# Patient Record
Sex: MALE | Race: WHITE | NOT HISPANIC OR LATINO | URBAN - METROPOLITAN AREA
[De-identification: names, ages, dates, MRNs, and addresses within clinical notes are randomized per-mention and may not be internally consistent; named-entity substitution may affect disease eponyms.]

---

## 2017-08-16 ENCOUNTER — INPATIENT (INPATIENT)
Facility: HOSPITAL | Age: 72
LOS: 1 days | Discharge: HOME | DRG: 192 | End: 2017-08-18
Attending: FAMILY MEDICINE | Admitting: INTERNAL MEDICINE
Payer: MEDICARE

## 2017-08-16 VITALS
TEMPERATURE: 98 F | RESPIRATION RATE: 16 BRPM | DIASTOLIC BLOOD PRESSURE: 74 MMHG | OXYGEN SATURATION: 91 % | HEIGHT: 73 IN | HEART RATE: 98 BPM | WEIGHT: 177.91 LBS | SYSTOLIC BLOOD PRESSURE: 105 MMHG

## 2017-08-16 DIAGNOSIS — Z95.5 PRESENCE OF CORONARY ANGIOPLASTY IMPLANT AND GRAFT: Chronic | ICD-10-CM

## 2017-08-16 DIAGNOSIS — R06.09 OTHER FORMS OF DYSPNEA: ICD-10-CM

## 2017-08-16 LAB
ALBUMIN SERPL ELPH-MCNC: 3.5 G/DL — SIGNIFICANT CHANGE UP (ref 3.3–5.2)
ALP SERPL-CCNC: 63 U/L — SIGNIFICANT CHANGE UP (ref 40–120)
ALT FLD-CCNC: 56 U/L — HIGH
ANION GAP SERPL CALC-SCNC: 18 MMOL/L — HIGH (ref 5–17)
AST SERPL-CCNC: 32 U/L — SIGNIFICANT CHANGE UP
BASE EXCESS BLDA CALC-SCNC: 1.5 MMOL/L — SIGNIFICANT CHANGE UP (ref -3–3)
BASOPHILS # BLD AUTO: 0 K/UL — SIGNIFICANT CHANGE UP (ref 0–0.2)
BASOPHILS NFR BLD AUTO: 0.3 % — SIGNIFICANT CHANGE UP (ref 0–2)
BILIRUB SERPL-MCNC: 0.9 MG/DL — SIGNIFICANT CHANGE UP (ref 0.4–2)
BUN SERPL-MCNC: 31 MG/DL — HIGH (ref 8–20)
CALCIUM SERPL-MCNC: 10.6 MG/DL — HIGH (ref 8.6–10.2)
CHLORIDE SERPL-SCNC: 99 MMOL/L — SIGNIFICANT CHANGE UP (ref 98–107)
CO2 SERPL-SCNC: 23 MMOL/L — SIGNIFICANT CHANGE UP (ref 22–29)
CREAT SERPL-MCNC: 1.76 MG/DL — HIGH (ref 0.5–1.3)
D DIMER BLD IA.RAPID-MCNC: 172 NG/ML DDU — SIGNIFICANT CHANGE UP
EOSINOPHIL # BLD AUTO: 0.9 K/UL — HIGH (ref 0–0.5)
EOSINOPHIL NFR BLD AUTO: 7.3 % — HIGH (ref 0–6)
GAS PNL BLDA: SIGNIFICANT CHANGE UP
GLUCOSE SERPL-MCNC: 113 MG/DL — SIGNIFICANT CHANGE UP (ref 70–115)
HCO3 BLDA-SCNC: 26 MMOL/L — SIGNIFICANT CHANGE UP (ref 20–26)
HCT VFR BLD CALC: 46.4 % — SIGNIFICANT CHANGE UP (ref 42–52)
HGB BLD-MCNC: 15.7 G/DL — SIGNIFICANT CHANGE UP (ref 14–18)
HOROWITZ INDEX BLDA+IHG-RTO: SIGNIFICANT CHANGE UP
LYMPHOCYTES # BLD AUTO: 1.1 K/UL — SIGNIFICANT CHANGE UP (ref 1–4.8)
LYMPHOCYTES # BLD AUTO: 8.6 % — LOW (ref 20–55)
MCHC RBC-ENTMCNC: 30.5 PG — SIGNIFICANT CHANGE UP (ref 27–31)
MCHC RBC-ENTMCNC: 33.8 G/DL — SIGNIFICANT CHANGE UP (ref 32–36)
MCV RBC AUTO: 90.3 FL — SIGNIFICANT CHANGE UP (ref 80–94)
MONOCYTES # BLD AUTO: 1 K/UL — HIGH (ref 0–0.8)
MONOCYTES NFR BLD AUTO: 7.7 % — SIGNIFICANT CHANGE UP (ref 3–10)
NEUTROPHILS # BLD AUTO: 9.6 K/UL — HIGH (ref 1.8–8)
NEUTROPHILS NFR BLD AUTO: 75.8 % — HIGH (ref 37–73)
NT-PROBNP SERPL-SCNC: 284 PG/ML — SIGNIFICANT CHANGE UP (ref 0–300)
PCO2 BLDA: 29 MMHG — LOW (ref 35–45)
PH BLDA: 7.51 — HIGH (ref 7.35–7.45)
PLATELET # BLD AUTO: 226 K/UL — SIGNIFICANT CHANGE UP (ref 150–400)
PO2 BLDA: 65 MMHG — LOW (ref 83–108)
POTASSIUM SERPL-MCNC: 4.7 MMOL/L — SIGNIFICANT CHANGE UP (ref 3.5–5.3)
POTASSIUM SERPL-SCNC: 4.7 MMOL/L — SIGNIFICANT CHANGE UP (ref 3.5–5.3)
PROT SERPL-MCNC: 6.6 G/DL — SIGNIFICANT CHANGE UP (ref 6.6–8.7)
RBC # BLD: 5.14 M/UL — SIGNIFICANT CHANGE UP (ref 4.6–6.2)
RBC # FLD: 16 % — HIGH (ref 11–15.6)
SAO2 % BLDA: 94 % — LOW (ref 95–99)
SODIUM SERPL-SCNC: 140 MMOL/L — SIGNIFICANT CHANGE UP (ref 135–145)
TROPONIN T SERPL-MCNC: <0.01 NG/ML — SIGNIFICANT CHANGE UP (ref 0–0.06)
WBC # BLD: 12.6 K/UL — HIGH (ref 4.8–10.8)
WBC # FLD AUTO: 12.6 K/UL — HIGH (ref 4.8–10.8)

## 2017-08-16 PROCEDURE — 71010: CPT | Mod: 26

## 2017-08-16 PROCEDURE — 99285 EMERGENCY DEPT VISIT HI MDM: CPT

## 2017-08-16 PROCEDURE — 93010 ELECTROCARDIOGRAM REPORT: CPT

## 2017-08-16 RX ORDER — SODIUM CHLORIDE 9 MG/ML
1000 INJECTION INTRAMUSCULAR; INTRAVENOUS; SUBCUTANEOUS ONCE
Qty: 0 | Refills: 0 | Status: COMPLETED | OUTPATIENT
Start: 2017-08-16 | End: 2017-08-16

## 2017-08-16 RX ADMIN — SODIUM CHLORIDE 1000 MILLILITER(S): 9 INJECTION INTRAMUSCULAR; INTRAVENOUS; SUBCUTANEOUS at 12:51

## 2017-08-16 RX ADMIN — Medication 125 MILLIGRAM(S): at 12:55

## 2017-08-16 NOTE — ED PROVIDER NOTE - CARE PLAN
Principal Discharge DX:	Chronic obstructive pulmonary disease, unspecified COPD type Principal Discharge DX:	Dyspnea on exertion

## 2017-08-16 NOTE — ED PROVIDER NOTE - OBJECTIVE STATEMENT
72 yo wm pmh copd, cad with history stent comes to ed with increasing shortness of breath, cough with yellowish phlegm and low grade fever  99; as per wife, pmd and cardiologist are from [place]

## 2017-08-16 NOTE — CONSULT NOTE ADULT - ASSESSMENT
Assess  AECOPD  Platelike atelectasis  Hypoxia  Rec  Steroid taper  DuoNeb  Azithromycin  Pursed-lip breathing taught to patient

## 2017-08-16 NOTE — ED ADULT NURSE NOTE - OBJECTIVE STATEMENT
assumed pt care at 1145.  pt awake alert and oriented x3 c/o shortness of breath and JASSO since monday.  hx of COPD.  lung sounds diminished on left side.  no signs of acute distress at this time.  denies chest pain.  moving all extremities well.  +1 edema to b/l lower extremities.  abdomen soft, nontender, non distended.  moving all extremities well.  skin warm and dry.  denies home o2 use.  NSR on cardiac monitor.

## 2017-08-16 NOTE — ED ADULT NURSE REASSESSMENT NOTE - NS ED NURSE REASSESS COMMENT FT1
pt remains awake alert and oriented x3 with no signs of acute distress.  call placed to CT, will send for pt.  will continue to monitor.

## 2017-08-16 NOTE — CONSULT NOTE ADULT - SUBJECTIVE AND OBJECTIVE BOX
1PULMONARY CONSULT NOTE      LORRI LUTZEMBER-[id number]    Patient is a 71y old  Male who presents with a chief complaint of dyspnea  Min cough.  Thick clear sputum    Advance COPD on Breo and spiriva  No 02  Chronic, stable, moderate, 1/2 flight JASSO relieved with 4 minutes of rest  DC cig in 2012    HISTORY OF PRESENT ILLNESS:  COPD  PSH: penile implant removal    MEDICATIONS  (STANDING):      MEDICATIONS  (PRN):      Allergies    ciprofloxacin (Unknown)  penicillin (Rash)  vancomycin (Unknown)    Intolerances        PAST MEDICAL & SURGICAL HISTORY:  Gout  CKD (chronic kidney disease)  Hyperlipidemia  Hypertension  H/O heart artery stent      FAMILY HISTORY:      SOCIAL HISTORY  Smoking History: 70 py - DC in 2012    REVIEW OF SYSTEMS:    CONSTITUTIONAL:  No fevers, chills, sweats    HEENT:  Eyes:  No diplopia or blurred vision. ENT:  No earache, sore throat or runny nose.    CARDIOVASCULAR:  No pressure, squeezing, tightness, or heaviness about the chest; no palpitations.    RESPIRATORY:  No cough,  PND or orthopnea. Mod SOBOE    GASTROINTESTINAL:  No abdominal pain, nausea, vomiting or diarrhea.    GENITOURINARY:  No dysuria, frequency or urgency.    NEUROLOGIC:  No paresthesias, fasciculations, seizures or weakness.    PSYCHIATRIC:  No disorder of thought or mood.    Vital Signs Last 24 Hrs  T(C): 36.9 (16 Aug 2017 13:08), Max: 36.9 (16 Aug 2017 13:08)  T(F): 98.4 (16 Aug 2017 13:08), Max: 98.4 (16 Aug 2017 13:08)  HR: 88 (16 Aug 2017 13:08) (88 - 98)  BP: 112/64 (16 Aug 2017 13:08) (105/74 - 112/64)  BP(mean): --  RR: 16 (16 Aug 2017 13:08) (16 - 16)  SpO2: 95% (16 Aug 2017 13:08) (91% - 95%)    PHYSICAL EXAMINATION:    GENERAL: The patient is a well-developed, well-nourished _____in no apparent distress.     HEENT: Head is normocephalic and atraumatic. Extraocular muscles are intact. Mucous membranes are moist.     NECK: Supple.     LUNGS: Clear to auscultation without wheezing, rales, or rhonchi. Respirations unlabored    HEART: Regular rate and rhythm without murmur.    ABDOMEN: Soft, nontender, and nondistended.  No hepatosplenomegaly is noted.    EXTREMITIES: Without any cyanosis, clubbing, rash, lesions or edema.    NEUROLOGIC: Grossly intact.      LABS:                        15.7   12.6  )-----------( 226      ( 16 Aug 2017 13:26 )             46.4     08-16    140  |  99  |  31.0<H>  ----------------------------<  113  4.7   |  23.0  |  1.76<H>    Ca    10.6<H>      16 Aug 2017 13:26    TPro  6.6  /  Alb  3.5  /  TBili  0.9  /  DBili  x   /  AST  32  /  ALT  56<H>  /  AlkPhos  63  08-16        ABG - ( 16 Aug 2017 12:45 )  pH: 7.51  /  pCO2: 29    /  pO2: 65    / HCO3: 26    / Base Excess: 1.5   /  SaO2: 94                CARDIAC MARKERS ( 16 Aug 2017 13:26 )  x     / <0.01 ng/mL / x     / x     / x          D-Dimer Assay, Quantitative: 172 ng/mL DDU (08-16-17 @ 13:26)    Serum Pro-Brain Natriuretic Peptide: 284 pg/mL (08-16-17 @ 13:26)          MICROBIOLOGY:    RADIOLOGY & ADDITIONAL STUDIES:  CXR on 8/16/17  HI  R> L basilar platelike atelect

## 2017-08-16 NOTE — ED PROVIDER NOTE - MEDICAL DECISION MAKING DETAILS
copd, cad with dyspnea on exertion; o2 sat 88-90 after exertion; ct angio eval pe; pulmonary evaluation

## 2017-08-17 VITALS — TEMPERATURE: 98 F | OXYGEN SATURATION: 92 % | HEART RATE: 93 BPM | RESPIRATION RATE: 20 BRPM

## 2017-08-17 DIAGNOSIS — I25.10 ATHEROSCLEROTIC HEART DISEASE OF NATIVE CORONARY ARTERY WITHOUT ANGINA PECTORIS: ICD-10-CM

## 2017-08-17 DIAGNOSIS — J44.1 CHRONIC OBSTRUCTIVE PULMONARY DISEASE WITH (ACUTE) EXACERBATION: ICD-10-CM

## 2017-08-17 DIAGNOSIS — M10.9 GOUT, UNSPECIFIED: ICD-10-CM

## 2017-08-17 DIAGNOSIS — E03.9 HYPOTHYROIDISM, UNSPECIFIED: ICD-10-CM

## 2017-08-17 DIAGNOSIS — I10 ESSENTIAL (PRIMARY) HYPERTENSION: ICD-10-CM

## 2017-08-17 DIAGNOSIS — Z29.9 ENCOUNTER FOR PROPHYLACTIC MEASURES, UNSPECIFIED: ICD-10-CM

## 2017-08-17 DIAGNOSIS — K21.9 GASTRO-ESOPHAGEAL REFLUX DISEASE WITHOUT ESOPHAGITIS: ICD-10-CM

## 2017-08-17 DIAGNOSIS — Z90.79 ACQUIRED ABSENCE OF OTHER GENITAL ORGAN(S): Chronic | ICD-10-CM

## 2017-08-17 DIAGNOSIS — E78.5 HYPERLIPIDEMIA, UNSPECIFIED: ICD-10-CM

## 2017-08-17 DIAGNOSIS — N18.9 CHRONIC KIDNEY DISEASE, UNSPECIFIED: ICD-10-CM

## 2017-08-17 LAB
ALBUMIN SERPL ELPH-MCNC: 3.6 G/DL — SIGNIFICANT CHANGE UP (ref 3.3–5.2)
ALP SERPL-CCNC: 61 U/L — SIGNIFICANT CHANGE UP (ref 40–120)
ALT FLD-CCNC: 42 U/L — HIGH
ANION GAP SERPL CALC-SCNC: 16 MMOL/L — SIGNIFICANT CHANGE UP (ref 5–17)
ANISOCYTOSIS BLD QL: SLIGHT — SIGNIFICANT CHANGE UP
AST SERPL-CCNC: 19 U/L — SIGNIFICANT CHANGE UP
BASE EXCESS BLDA CALC-SCNC: 0.4 MMOL/L — SIGNIFICANT CHANGE UP (ref -3–3)
BILIRUB SERPL-MCNC: 0.5 MG/DL — SIGNIFICANT CHANGE UP (ref 0.4–2)
BLOOD GAS COMMENTS ARTERIAL: SIGNIFICANT CHANGE UP
BUN SERPL-MCNC: 34 MG/DL — HIGH (ref 8–20)
CALCIUM SERPL-MCNC: 9.4 MG/DL — SIGNIFICANT CHANGE UP (ref 8.6–10.2)
CHLORIDE SERPL-SCNC: 101 MMOL/L — SIGNIFICANT CHANGE UP (ref 98–107)
CHOLEST SERPL-MCNC: 191 MG/DL — SIGNIFICANT CHANGE UP (ref 110–199)
CO2 SERPL-SCNC: 22 MMOL/L — SIGNIFICANT CHANGE UP (ref 22–29)
CREAT ?TM UR-MCNC: 111 MG/DL — SIGNIFICANT CHANGE UP
CREAT ?TM UR-MCNC: 114 MG/DL — SIGNIFICANT CHANGE UP
CREAT SERPL-MCNC: 1.72 MG/DL — HIGH (ref 0.5–1.3)
D DIMER BLD IA.RAPID-MCNC: 269 NG/ML DDU — HIGH
EOSINOPHIL NFR BLD AUTO: 1 % — SIGNIFICANT CHANGE UP (ref 0–6)
GAS PNL BLDA: SIGNIFICANT CHANGE UP
GLUCOSE SERPL-MCNC: 198 MG/DL — HIGH (ref 70–115)
HCO3 BLDA-SCNC: 25 MMOL/L — SIGNIFICANT CHANGE UP (ref 20–26)
HCT VFR BLD CALC: 43.4 % — SIGNIFICANT CHANGE UP (ref 42–52)
HDLC SERPL-MCNC: 63 MG/DL — SIGNIFICANT CHANGE UP
HGB BLD-MCNC: 15 G/DL — SIGNIFICANT CHANGE UP (ref 14–18)
HOROWITZ INDEX BLDA+IHG-RTO: 0.21 — SIGNIFICANT CHANGE UP
HYPOCHROMIA BLD QL: SLIGHT — SIGNIFICANT CHANGE UP
LIPID PNL WITH DIRECT LDL SERPL: 109 MG/DL — SIGNIFICANT CHANGE UP
LYMPHOCYTES # BLD AUTO: 7 % — LOW (ref 20–55)
MACROCYTES BLD QL: SLIGHT — SIGNIFICANT CHANGE UP
MAGNESIUM SERPL-MCNC: 1.9 MG/DL — SIGNIFICANT CHANGE UP (ref 1.6–2.6)
MCHC RBC-ENTMCNC: 30.9 PG — SIGNIFICANT CHANGE UP (ref 27–31)
MCHC RBC-ENTMCNC: 34.6 G/DL — SIGNIFICANT CHANGE UP (ref 32–36)
MCV RBC AUTO: 89.5 FL — SIGNIFICANT CHANGE UP (ref 80–94)
MICROCYTES BLD QL: SLIGHT — SIGNIFICANT CHANGE UP
MONOCYTES NFR BLD AUTO: 5 % — SIGNIFICANT CHANGE UP (ref 3–10)
NEUTROPHILS NFR BLD AUTO: 85 % — HIGH (ref 37–73)
NT-PROBNP SERPL-SCNC: 182 PG/ML — SIGNIFICANT CHANGE UP (ref 0–300)
OVALOCYTES BLD QL SMEAR: SLIGHT — SIGNIFICANT CHANGE UP
PCO2 BLDA: 30 MMHG — LOW (ref 35–45)
PH BLDA: 7.49 — HIGH (ref 7.35–7.45)
PHOSPHATE SERPL-MCNC: 3.4 MG/DL — SIGNIFICANT CHANGE UP (ref 2.4–4.7)
PLAT MORPH BLD: NORMAL — SIGNIFICANT CHANGE UP
PLATELET # BLD AUTO: 205 K/UL — SIGNIFICANT CHANGE UP (ref 150–400)
PO2 BLDA: 61 MMHG — LOW (ref 83–108)
POIKILOCYTOSIS BLD QL AUTO: SLIGHT — SIGNIFICANT CHANGE UP
POTASSIUM SERPL-MCNC: 4.3 MMOL/L — SIGNIFICANT CHANGE UP (ref 3.5–5.3)
POTASSIUM SERPL-SCNC: 4.3 MMOL/L — SIGNIFICANT CHANGE UP (ref 3.5–5.3)
PREALB SERPL-MCNC: 21 MG/DL — SIGNIFICANT CHANGE UP (ref 18–38)
PROCALCITONIN SERPL-MCNC: <0.05 NG/ML — SIGNIFICANT CHANGE UP (ref 0–0.04)
PROT ?TM UR-MCNC: 7 MG/DL — SIGNIFICANT CHANGE UP (ref 0–12)
PROT SERPL-MCNC: 6.4 G/DL — LOW (ref 6.6–8.7)
PROT/CREAT UR-RTO: 0.1 RATIO — SIGNIFICANT CHANGE UP
RBC # BLD: 4.85 M/UL — SIGNIFICANT CHANGE UP (ref 4.6–6.2)
RBC # FLD: 15.2 % — SIGNIFICANT CHANGE UP (ref 11–15.6)
RBC BLD AUTO: ABNORMAL
SAO2 % BLDA: 92 % — LOW (ref 95–99)
SODIUM SERPL-SCNC: 139 MMOL/L — SIGNIFICANT CHANGE UP (ref 135–145)
SODIUM UR-SCNC: 31 MMOL/L — SIGNIFICANT CHANGE UP
TOTAL CHOLESTEROL/HDL RATIO MEASUREMENT: 3 RATIO — LOW (ref 3.4–9.6)
TRIGL SERPL-MCNC: 93 MG/DL — SIGNIFICANT CHANGE UP (ref 10–200)
TSH SERPL-MCNC: 0.53 UIU/ML — SIGNIFICANT CHANGE UP (ref 0.27–4.2)
VARIANT LYMPHS # BLD: 2 % — SIGNIFICANT CHANGE UP (ref 0–6)
WBC # BLD: 10.5 K/UL — SIGNIFICANT CHANGE UP (ref 4.8–10.8)
WBC # FLD AUTO: 10.5 K/UL — SIGNIFICANT CHANGE UP (ref 4.8–10.8)

## 2017-08-17 PROCEDURE — 84300 ASSAY OF URINE SODIUM: CPT

## 2017-08-17 PROCEDURE — 83880 ASSAY OF NATRIURETIC PEPTIDE: CPT

## 2017-08-17 PROCEDURE — 93005 ELECTROCARDIOGRAM TRACING: CPT

## 2017-08-17 PROCEDURE — 84156 ASSAY OF PROTEIN URINE: CPT

## 2017-08-17 PROCEDURE — 36415 COLL VENOUS BLD VENIPUNCTURE: CPT

## 2017-08-17 PROCEDURE — 93306 TTE W/DOPPLER COMPLETE: CPT | Mod: 26

## 2017-08-17 PROCEDURE — 85027 COMPLETE CBC AUTOMATED: CPT

## 2017-08-17 PROCEDURE — 80053 COMPREHEN METABOLIC PANEL: CPT

## 2017-08-17 PROCEDURE — 96375 TX/PRO/DX INJ NEW DRUG ADDON: CPT | Mod: XU

## 2017-08-17 PROCEDURE — 84443 ASSAY THYROID STIM HORMONE: CPT

## 2017-08-17 PROCEDURE — 84134 ASSAY OF PREALBUMIN: CPT

## 2017-08-17 PROCEDURE — 93970 EXTREMITY STUDY: CPT

## 2017-08-17 PROCEDURE — 84100 ASSAY OF PHOSPHORUS: CPT

## 2017-08-17 PROCEDURE — 84484 ASSAY OF TROPONIN QUANT: CPT

## 2017-08-17 PROCEDURE — 82570 ASSAY OF URINE CREATININE: CPT

## 2017-08-17 PROCEDURE — 71045 X-RAY EXAM CHEST 1 VIEW: CPT

## 2017-08-17 PROCEDURE — 99285 EMERGENCY DEPT VISIT HI MDM: CPT | Mod: 25

## 2017-08-17 PROCEDURE — 93970 EXTREMITY STUDY: CPT | Mod: 26

## 2017-08-17 PROCEDURE — 93306 TTE W/DOPPLER COMPLETE: CPT

## 2017-08-17 PROCEDURE — 76770 US EXAM ABDO BACK WALL COMP: CPT

## 2017-08-17 PROCEDURE — 99239 HOSP IP/OBS DSCHRG MGMT >30: CPT

## 2017-08-17 PROCEDURE — 99223 1ST HOSP IP/OBS HIGH 75: CPT

## 2017-08-17 PROCEDURE — 83735 ASSAY OF MAGNESIUM: CPT

## 2017-08-17 PROCEDURE — 96374 THER/PROPH/DIAG INJ IV PUSH: CPT | Mod: XU

## 2017-08-17 PROCEDURE — 76770 US EXAM ABDO BACK WALL COMP: CPT | Mod: 26

## 2017-08-17 PROCEDURE — 80061 LIPID PANEL: CPT

## 2017-08-17 PROCEDURE — 85379 FIBRIN DEGRADATION QUANT: CPT

## 2017-08-17 PROCEDURE — 82803 BLOOD GASES ANY COMBINATION: CPT

## 2017-08-17 PROCEDURE — 94640 AIRWAY INHALATION TREATMENT: CPT

## 2017-08-17 PROCEDURE — 36600 WITHDRAWAL OF ARTERIAL BLOOD: CPT

## 2017-08-17 PROCEDURE — 84145 PROCALCITONIN (PCT): CPT

## 2017-08-17 PROCEDURE — 87040 BLOOD CULTURE FOR BACTERIA: CPT

## 2017-08-17 RX ORDER — LEVOTHYROXINE SODIUM 125 MCG
1 TABLET ORAL
Qty: 0 | Refills: 0 | COMMUNITY
Start: 2017-08-17

## 2017-08-17 RX ORDER — LEVOTHYROXINE SODIUM 125 MCG
75 TABLET ORAL EVERY OTHER DAY
Qty: 0 | Refills: 0 | Status: DISCONTINUED | OUTPATIENT
Start: 2017-08-17 | End: 2017-08-18

## 2017-08-17 RX ORDER — SACCHAROMYCES BOULARDII 250 MG
1 POWDER IN PACKET (EA) ORAL
Qty: 6 | Refills: 0 | OUTPATIENT
Start: 2017-08-17 | End: 2017-08-20

## 2017-08-17 RX ORDER — FUROSEMIDE 40 MG
20 TABLET ORAL DAILY
Qty: 0 | Refills: 0 | Status: DISCONTINUED | OUTPATIENT
Start: 2017-08-17 | End: 2017-08-18

## 2017-08-17 RX ORDER — AZITHROMYCIN 500 MG/1
1 TABLET, FILM COATED ORAL
Qty: 3 | Refills: 0 | OUTPATIENT
Start: 2017-08-17 | End: 2017-08-20

## 2017-08-17 RX ORDER — CLOPIDOGREL BISULFATE 75 MG/1
1 TABLET, FILM COATED ORAL
Qty: 0 | Refills: 0 | COMMUNITY

## 2017-08-17 RX ORDER — TIOTROPIUM BROMIDE 18 UG/1
2 CAPSULE ORAL; RESPIRATORY (INHALATION)
Qty: 1 | Refills: 0 | OUTPATIENT
Start: 2017-08-17 | End: 2017-09-16

## 2017-08-17 RX ORDER — ATORVASTATIN CALCIUM 80 MG/1
1 TABLET, FILM COATED ORAL
Qty: 30 | Refills: 0 | OUTPATIENT
Start: 2017-08-17 | End: 2017-09-16

## 2017-08-17 RX ORDER — ALBUTEROL 90 UG/1
0 AEROSOL, METERED ORAL
Qty: 0 | Refills: 0 | COMMUNITY

## 2017-08-17 RX ORDER — LEVOTHYROXINE SODIUM 125 MCG
1 TABLET ORAL
Qty: 15 | Refills: 0 | OUTPATIENT
Start: 2017-08-17 | End: 2017-09-16

## 2017-08-17 RX ORDER — ASPIRIN/CALCIUM CARB/MAGNESIUM 324 MG
1 TABLET ORAL
Qty: 30 | Refills: 0 | OUTPATIENT
Start: 2017-08-17 | End: 2017-09-16

## 2017-08-17 RX ORDER — LACOSAMIDE 50 MG/1
100 TABLET ORAL ONCE
Qty: 0 | Refills: 0 | Status: COMPLETED | OUTPATIENT
Start: 2017-08-17 | End: 2017-08-17

## 2017-08-17 RX ORDER — FLUTICASONE FUROATE AND VILANTEROL TRIFENATATE 100; 25 UG/1; UG/1
1 POWDER RESPIRATORY (INHALATION)
Qty: 1 | Refills: 0 | OUTPATIENT
Start: 2017-08-17 | End: 2017-09-16

## 2017-08-17 RX ORDER — PRIMIDONE 250 MG/1
250 TABLET ORAL ONCE
Qty: 0 | Refills: 0 | Status: DISCONTINUED | OUTPATIENT
Start: 2017-08-17 | End: 2017-08-18

## 2017-08-17 RX ORDER — PANTOPRAZOLE SODIUM 20 MG/1
0 TABLET, DELAYED RELEASE ORAL
Qty: 0 | Refills: 0 | COMMUNITY

## 2017-08-17 RX ORDER — IPRATROPIUM/ALBUTEROL SULFATE 18-103MCG
3 AEROSOL WITH ADAPTER (GRAM) INHALATION EVERY 6 HOURS
Qty: 0 | Refills: 0 | Status: DISCONTINUED | OUTPATIENT
Start: 2017-08-17 | End: 2017-08-18

## 2017-08-17 RX ORDER — FUROSEMIDE 40 MG
0 TABLET ORAL
Qty: 0 | Refills: 0 | COMMUNITY

## 2017-08-17 RX ORDER — ALBUTEROL 90 UG/1
2.5 AEROSOL, METERED ORAL EVERY 4 HOURS
Qty: 0 | Refills: 0 | Status: DISCONTINUED | OUTPATIENT
Start: 2017-08-17 | End: 2017-08-17

## 2017-08-17 RX ORDER — LEVOTHYROXINE SODIUM 125 MCG
1 TABLET ORAL
Qty: 0 | Refills: 0 | COMMUNITY

## 2017-08-17 RX ORDER — AZITHROMYCIN 500 MG/1
500 TABLET, FILM COATED ORAL ONCE
Qty: 0 | Refills: 0 | Status: COMPLETED | OUTPATIENT
Start: 2017-08-17 | End: 2017-08-17

## 2017-08-17 RX ORDER — ATORVASTATIN CALCIUM 80 MG/1
0 TABLET, FILM COATED ORAL
Qty: 0 | Refills: 0 | COMMUNITY

## 2017-08-17 RX ORDER — TIOTROPIUM BROMIDE 18 UG/1
2 CAPSULE ORAL; RESPIRATORY (INHALATION)
Qty: 0 | Refills: 0 | COMMUNITY

## 2017-08-17 RX ORDER — ALLOPURINOL 300 MG
1 TABLET ORAL
Qty: 30 | Refills: 0 | OUTPATIENT
Start: 2017-08-17 | End: 2017-09-16

## 2017-08-17 RX ORDER — ALBUTEROL 90 UG/1
2 AEROSOL, METERED ORAL
Qty: 1 | Refills: 0 | OUTPATIENT
Start: 2017-08-17 | End: 2017-09-16

## 2017-08-17 RX ORDER — ALLOPURINOL 300 MG
100 TABLET ORAL DAILY
Qty: 0 | Refills: 0 | Status: DISCONTINUED | OUTPATIENT
Start: 2017-08-17 | End: 2017-08-18

## 2017-08-17 RX ORDER — IPRATROPIUM/ALBUTEROL SULFATE 18-103MCG
3 AEROSOL WITH ADAPTER (GRAM) INHALATION
Qty: 0 | Refills: 0 | COMMUNITY
Start: 2017-08-17

## 2017-08-17 RX ORDER — CLOPIDOGREL BISULFATE 75 MG/1
75 TABLET, FILM COATED ORAL DAILY
Qty: 0 | Refills: 0 | Status: DISCONTINUED | OUTPATIENT
Start: 2017-08-17 | End: 2017-08-18

## 2017-08-17 RX ORDER — ATORVASTATIN CALCIUM 80 MG/1
1 TABLET, FILM COATED ORAL
Qty: 0 | Refills: 0 | COMMUNITY
Start: 2017-08-17

## 2017-08-17 RX ORDER — ATORVASTATIN CALCIUM 80 MG/1
1 TABLET, FILM COATED ORAL
Qty: 0 | Refills: 0 | COMMUNITY

## 2017-08-17 RX ORDER — LEVOTHYROXINE SODIUM 125 MCG
88 TABLET ORAL EVERY OTHER DAY
Qty: 0 | Refills: 0 | Status: DISCONTINUED | OUTPATIENT
Start: 2017-08-18 | End: 2017-08-18

## 2017-08-17 RX ORDER — SACCHAROMYCES BOULARDII 250 MG
1 POWDER IN PACKET (EA) ORAL
Qty: 5 | Refills: 0 | OUTPATIENT
Start: 2017-08-17 | End: 2017-09-16

## 2017-08-17 RX ORDER — PANTOPRAZOLE SODIUM 20 MG/1
1 TABLET, DELAYED RELEASE ORAL
Qty: 0 | Refills: 0 | COMMUNITY

## 2017-08-17 RX ORDER — ALBUTEROL 90 UG/1
2 AEROSOL, METERED ORAL
Qty: 0 | Refills: 0 | COMMUNITY

## 2017-08-17 RX ORDER — ENOXAPARIN SODIUM 100 MG/ML
40 INJECTION SUBCUTANEOUS DAILY
Qty: 0 | Refills: 0 | Status: DISCONTINUED | OUTPATIENT
Start: 2017-08-17 | End: 2017-08-18

## 2017-08-17 RX ORDER — FUROSEMIDE 40 MG
1 TABLET ORAL
Qty: 0 | Refills: 0 | COMMUNITY

## 2017-08-17 RX ORDER — ENOXAPARIN SODIUM 100 MG/ML
30 INJECTION SUBCUTANEOUS DAILY
Qty: 0 | Refills: 0 | Status: DISCONTINUED | OUTPATIENT
Start: 2017-08-17 | End: 2017-08-17

## 2017-08-17 RX ORDER — LEVOTHYROXINE SODIUM 125 MCG
0 TABLET ORAL
Qty: 0 | Refills: 0 | COMMUNITY

## 2017-08-17 RX ORDER — FUROSEMIDE 40 MG
1 TABLET ORAL
Qty: 30 | Refills: 0 | OUTPATIENT
Start: 2017-08-17 | End: 2017-09-16

## 2017-08-17 RX ORDER — ASPIRIN/CALCIUM CARB/MAGNESIUM 324 MG
1 TABLET ORAL
Qty: 0 | Refills: 0 | COMMUNITY

## 2017-08-17 RX ORDER — CLOPIDOGREL BISULFATE 75 MG/1
1 TABLET, FILM COATED ORAL
Qty: 30 | Refills: 0 | OUTPATIENT
Start: 2017-08-17 | End: 2017-09-16

## 2017-08-17 RX ORDER — AZITHROMYCIN 500 MG/1
TABLET, FILM COATED ORAL
Qty: 0 | Refills: 0 | Status: DISCONTINUED | OUTPATIENT
Start: 2017-08-17 | End: 2017-08-17

## 2017-08-17 RX ORDER — SACCHAROMYCES BOULARDII 250 MG
250 POWDER IN PACKET (EA) ORAL
Qty: 0 | Refills: 0 | Status: DISCONTINUED | OUTPATIENT
Start: 2017-08-17 | End: 2017-08-18

## 2017-08-17 RX ORDER — PANTOPRAZOLE SODIUM 20 MG/1
40 TABLET, DELAYED RELEASE ORAL
Qty: 0 | Refills: 0 | Status: DISCONTINUED | OUTPATIENT
Start: 2017-08-17 | End: 2017-08-18

## 2017-08-17 RX ORDER — FLUTICASONE FUROATE AND VILANTEROL TRIFENATATE 100; 25 UG/1; UG/1
1 POWDER RESPIRATORY (INHALATION)
Qty: 0 | Refills: 0 | COMMUNITY

## 2017-08-17 RX ORDER — CLONAZEPAM 1 MG
0.5 TABLET ORAL ONCE
Qty: 0 | Refills: 0 | Status: DISCONTINUED | OUTPATIENT
Start: 2017-08-17 | End: 2017-08-18

## 2017-08-17 RX ORDER — AZITHROMYCIN 500 MG/1
500 TABLET, FILM COATED ORAL EVERY 24 HOURS
Qty: 0 | Refills: 0 | Status: DISCONTINUED | OUTPATIENT
Start: 2017-08-18 | End: 2017-08-18

## 2017-08-17 RX ORDER — ASPIRIN/CALCIUM CARB/MAGNESIUM 324 MG
81 TABLET ORAL DAILY
Qty: 0 | Refills: 0 | Status: DISCONTINUED | OUTPATIENT
Start: 2017-08-17 | End: 2017-08-18

## 2017-08-17 RX ORDER — CLOPIDOGREL BISULFATE 75 MG/1
0 TABLET, FILM COATED ORAL
Qty: 0 | Refills: 0 | COMMUNITY

## 2017-08-17 RX ORDER — ATORVASTATIN CALCIUM 80 MG/1
10 TABLET, FILM COATED ORAL AT BEDTIME
Qty: 0 | Refills: 0 | Status: DISCONTINUED | OUTPATIENT
Start: 2017-08-17 | End: 2017-08-18

## 2017-08-17 RX ORDER — ALBUTEROL 90 UG/1
2.5 AEROSOL, METERED ORAL EVERY 6 HOURS
Qty: 0 | Refills: 0 | Status: DISCONTINUED | OUTPATIENT
Start: 2017-08-17 | End: 2017-08-18

## 2017-08-17 RX ORDER — MAGNESIUM SULFATE 500 MG/ML
1 VIAL (ML) INJECTION ONCE
Qty: 0 | Refills: 0 | Status: COMPLETED | OUTPATIENT
Start: 2017-08-17 | End: 2017-08-17

## 2017-08-17 RX ADMIN — Medication 250 MILLIGRAM(S): at 05:27

## 2017-08-17 RX ADMIN — Medication 3 MILLILITER(S): at 03:16

## 2017-08-17 RX ADMIN — AZITHROMYCIN 250 MILLIGRAM(S): 500 TABLET, FILM COATED ORAL at 01:27

## 2017-08-17 RX ADMIN — PANTOPRAZOLE SODIUM 40 MILLIGRAM(S): 20 TABLET, DELAYED RELEASE ORAL at 05:27

## 2017-08-17 RX ADMIN — Medication 100 GRAM(S): at 08:04

## 2017-08-17 RX ADMIN — Medication 3 MILLILITER(S): at 14:59

## 2017-08-17 RX ADMIN — Medication 40 MILLIGRAM(S): at 05:27

## 2017-08-17 NOTE — DISCHARGE NOTE ADULT - PATIENT PORTAL LINK FT
“You can access the Follow[facility] Patient Portal, offered by [facility], by registering with the following website: [email or web address]”

## 2017-08-17 NOTE — PROGRESS NOTE ADULT - PROBLEM SELECTOR PLAN 1
Oxygen 2L NC titrate as needed to keep Saturation between 90-94%  Duonebs Q6H & Albuterol Q6H PRN  s/p Solumedrol 125mg IVP X 1 in ED  Solumedrol 40 mg IVP Q12H  Zithromax 500mg IVP daily (antibiotics day 2)  Pulmonary (Dr. Helton) on board   Protonix 40mg PO daily  f/u Sputum & blood cultures

## 2017-08-17 NOTE — DISCHARGE NOTE ADULT - PLAN OF CARE
resolving continue treatment with your nebulizers f/u with a nephrologist; maintenance continue bp meds; maintenance; continue aspirin and statins; continue statins continue treatment with your nebulizers  continue prednisone taper  Follow up with Pulmonologist  COnsider work up for sleep apnea; might need a sleep study f/u with a nephrologist; maintain euvolemia

## 2017-08-17 NOTE — PROGRESS NOTE ADULT - SUBJECTIVE AND OBJECTIVE BOX
INTERVAL HPI/OVERNIGHT EVENTS:  71 year old male w/ PMH of COPD, CAD s/p stent, CKD, hypothyroidism, HTN, HLD & gout presents with a chief complaint of SOB (17 Aug 2017 00:16)    Patient seen and examined at bedside, No acute overnight events. Patient states that SOB have improved. Patient is tolerating PO, voiding & stooling without any difficulties.       ROS: denies fever, chills, chest pain, abdominal pain, diarrhea, calf pain.    Allergies    ciprofloxacin (Unknown)  penicillin (Rash)  vancomycin (Unknown)    Intolerances    Vital Signs Last 24 Hrs  T(F): 97.8 (17 Aug 2017 04:00), Max: 98.4 (16 Aug 2017 13:08)  HR: 90 (17 Aug 2017 04:00) (88 - 98)  BP: 112/68 (17 Aug 2017 04:00) (105/74 - 135/66)  RR: 19 (17 Aug 2017 04:00) (16 - 19)  SpO2: 91% (17 Aug 2017 04:00) (91% - 96%)    Physical Exam:   GENERAL: NAD, well-groomed, well-developed  HEAD:  Atraumatic, Normocephalic  NECK: Supple, No JVD, Normal thyroid  CHEST/LUNG: mild left expiratory wheezes; No rales, rhonchi, or rubs  HEART: Regular rate and rhythm; No murmurs, rubs, or gallops  ABDOMEN: Soft, Nontender, Nondistended; Bowel sounds present  EXTREMITIES:  2+ Peripheral Pulses, No clubbing, cyanosis, or edema  SKIN: No rashes or lesions  Labs:                        15.7   12.6  )-----------( 226      ( 16 Aug 2017 13:26 )             46.4   08-16    140  |  99  |  31.0<H>  ----------------------------<  113  4.7   |  23.0  |  1.76<H>    Ca    10.6<H>      16 Aug 2017 13:26    TPro  6.6  /  Alb  3.5  /  TBili  0.9  /  DBili  x   /  AST  32  /  ALT  56<H>  /  AlkPhos  63  08-16      Radiology:      Medications:  MEDICATIONS  (STANDING):  ALBUTerol/ipratropium for Nebulization 3 milliLiter(s) Nebulizer every 6 hours  methylPREDNISolone sodium succinate Injectable 40 milliGRAM(s) IV Push every 12 hours  allopurinol 100 milliGRAM(s) Oral daily  atorvastatin 10 milliGRAM(s) Oral at bedtime  pantoprazole    Tablet 40 milliGRAM(s) Oral before breakfast  levothyroxine 88 MICROGram(s) Oral every other day  levothyroxine 75 MICROGram(s) Oral every other day  clopidogrel Tablet 75 milliGRAM(s) Oral daily  aspirin  chewable 81 milliGRAM(s) Oral daily  azithromycin  IVPB 500 milliGRAM(s) IV Intermittent every 24 hours  saccharomyces boulardii 250 milliGRAM(s) Oral two times a day  enoxaparin Injectable 40 milliGRAM(s) SubCutaneous daily    MEDICATIONS  (PRN):  furosemide    Tablet 20 milliGRAM(s) Oral daily PRN fluid overload  ALBUTerol    0.083% 2.5 milliGRAM(s) Nebulizer every 6 hours PRN Shortness of Breath and/or Wheezing INTERVAL HPI/OVERNIGHT EVENTS:  71 year old male w/ PMH of COPD, CAD s/p stent, CKD, hypothyroidism, HTN, HLD & gout presents with a chief complaint of SOB (17 Aug 2017 00:16)    Patient seen and examined at bedside, No acute overnight events. Patient states that SOB have improved. Patient is tolerating PO, voiding & stooling without any difficulties.       ROS: denies fever, chills, chest pain, abdominal pain, diarrhea, calf pain.    Allergies    ciprofloxacin (Unknown)  penicillin (Rash)  vancomycin (Unknown)    Intolerances    Vital Signs Last 24 Hrs  T(F): 97.8 (17 Aug 2017 04:00), Max: 98.4 (16 Aug 2017 13:08)  HR: 90 (17 Aug 2017 04:00) (88 - 98)  BP: 112/68 (17 Aug 2017 04:00) (105/74 - 135/66)  RR: 19 (17 Aug 2017 04:00) (16 - 19)  SpO2: 91% (17 Aug 2017 04:00) (91% - 96%)    Physical Exam:   GENERAL: NAD, well-groomed, well-developed  HEAD:  Atraumatic, Normocephalic  NECK: Supple, No JVD, Normal thyroid  CHEST/LUNG: mild left expiratory wheezes; No rales, rhonchi, or rubs  HEART: Regular rate and rhythm; No murmurs, rubs, or gallops  ABDOMEN: Soft, Nontender, Nondistended; Bowel sounds present  EXTREMITIES:  2+ Peripheral Pulses, No clubbing, cyanosis, or edema  SKIN: No rashes or lesions  Labs:                                   15.0   10.5  )-----------( 205      ( 17 Aug 2017 05:12 )             43.4     08-16    140  |  99  |  31.0<H>  ----------------------------<  113  4.7   |  23.0  |  1.76<H>    Ca    10.6<H>      16 Aug 2017 13:26    TPro  6.6  /  Alb  3.5  /  TBili  0.9  /  DBili  x   /  AST  32  /  ALT  56<H>  /  AlkPhos  63  08-16      Radiology:      Medications:  MEDICATIONS  (STANDING):  ALBUTerol/ipratropium for Nebulization 3 milliLiter(s) Nebulizer every 6 hours  methylPREDNISolone sodium succinate Injectable 40 milliGRAM(s) IV Push every 12 hours  allopurinol 100 milliGRAM(s) Oral daily  atorvastatin 10 milliGRAM(s) Oral at bedtime  pantoprazole    Tablet 40 milliGRAM(s) Oral before breakfast  levothyroxine 88 MICROGram(s) Oral every other day  levothyroxine 75 MICROGram(s) Oral every other day  clopidogrel Tablet 75 milliGRAM(s) Oral daily  aspirin  chewable 81 milliGRAM(s) Oral daily  azithromycin  IVPB 500 milliGRAM(s) IV Intermittent every 24 hours  saccharomyces boulardii 250 milliGRAM(s) Oral two times a day  enoxaparin Injectable 40 milliGRAM(s) SubCutaneous daily    MEDICATIONS  (PRN):  furosemide    Tablet 20 milliGRAM(s) Oral daily PRN fluid overload  ALBUTerol    0.083% 2.5 milliGRAM(s) Nebulizer every 6 hours PRN Shortness of Breath and/or Wheezing

## 2017-08-17 NOTE — CONSULT NOTE ADULT - SUBJECTIVE AND OBJECTIVE BOX
Renal :    DX : CKD - 3 ( Under the crae of a Nephrologist )    Wife @ side,    Seen & Examined,      HPI : 71 year old  W male w/ PMH of COPD, CAD s/p stent, CKD, hypothyroidism, HTN & gout presents w/ with increasing shortness of breath for 1 week. Patient recently drove from [place] to [place] and was planning to drive back to [place] today but he was noted to have subjective fever, productive cough w/ yellow sputum. Patient states that he's been complaint his medications and diet. Patient uses 2 pillows to sleep and can walk 1 block before getting out of breath. Patient admits to orthopnea but denies of any chest pain, palpitations, sick contacts or lower extremities swelling, calf tenderness, bowel or urinary changes.     Review of Systems:  Other Review of Systems: All other review of systems negative, except as noted in HPI	      Allergies and Intolerances:        Allergies:  	penicillin: Drug, Rash  	vancomycin: Drug, Unknown  	ciprofloxacin: Drug, Unknown    Home Medications:   * Patient Currently Takes Medications as of 17-Aug-2017 01:08 documented in Prescription Writer  · 	atorvastatin 10 mg oral tablet: 1 tab(s) orally once a day, Last Dose Taken:    · 	clopidogrel 75 mg oral tablet: 1 tab(s) orally once a day, Last Dose Taken:    · 	Lasix 20 mg oral tablet: 1 tab(s) orally once a day, As Needed, Last Dose Taken:    · 	pantoprazole 40 mg oral delayed release tablet: 1 tab(s) orally once a day, Last Dose Taken:    · 	Synthroid 75 mcg (0.075 mg) oral tablet: 1 tab(s) orally every other day, Last Dose Taken:    · 	Synthroid 88 mcg (0.088 mg) oral tablet: 1 tab(s) orally every other day, Last Dose Taken:    · 	Ventolin HFA 90 mcg/inh inhalation aerosol: 2 puff(s) inhaled 4 times a day, Last Dose Taken:    · 	Breo Ellipta 100 mcg-25 mcg/inh inhalation powder: 1 puff(s) inhaled once a day, Last Dose Taken:    · 	Spiriva Respimat 1.25 mcg/inh inhalation aerosol: 2 puff(s) inhaled once a day, Last Dose Taken:    · 	aspirin 81 mg oral tablet: 1 tab(s) orally once a day, Last Dose Taken:      . .    Patient History:   Past Medical History:  CAD (coronary artery disease)    CKD (chronic kidney disease)    COPD (chronic obstructive pulmonary disease)    Gout    Hyperlipidemia    Hypertension    Hypothyroid.    Past Surgical History:  H/O heart artery stent    S/P prostatectomy.    Family History:  <<-----Click on this checkbox to enter Family History . Family history of MI (myocardial infarction), Brother     Father  Still living? Unknown  Family history of prostate cancer in father, Age at diagnosis: Age Unknown.    Social History:  Social History (marital status, living situation, occupation, tobacco use, alcohol and drug use, and sexual history): Retired, lives with wife in [place] former smoker (40 pack years, quit 5-6 years ago) occasional alcohol consumption denies drug use	    Tobacco Screening:  · Core Measure Site	No	    Risk Assessment:   Present on Admission:  Deep Venous Thrombosis	suspected	  Pulmonary Embolus	no	    Heart Failure:  Does this patient have a history of or has been diagnosed with heart failure? unknown.      Physical Exam:  Physical Exam: T(F): 98.1 HR: 98 BP: 105/74 RR: 16 SpO2: 91% on RA  Physical Exam:  GENERAL: NAD, well-groomed, well-developed HEAD:  Atraumatic, Normocephalic EYES: EOMI, PERRLA, conjunctiva and sclera clear NECK: Supple, No JVD, Normal thyroid NERVOUS SYSTEM:  Alert & Oriented X3, Good concentration; Motor Strength 5/5 B/L upper and lower extremities; DTRs 2+ intact and symmetric CHEST/LUNG: mild left expiratory wheezes; No rales, rhonchi, or rubs HEART: Regular rate and rhythm; No murmurs, rubs, or gallops ABDOMEN: Soft, Nontender, Nondistended; Bowel sounds present EXTREMITIES:  2+ Peripheral Pulses, No clubbing, cyanosis, or edema LYMPH: No lymphadenopathy noted SKIN: No rashes or lesions	      Labs and Results:                15.7  12.6  )-----------( 226      ( 16 Aug 2017 13:26 )            46.4   08-16  140  |  99  |  31.0<H> ----------------------------<  113 4.7   |  23.0  |  1.76<H>  Ca    10.6<H>      16 Aug 2017 13:26  TPro  6.6  /  Alb  3.5  /  TBili  0.9  /  DBili  x   /  AST  32  /  ALT  56<H>  /  AlkPhos  63  08-16	    Laboratory:   Blood Gas:	    16-Aug-2017 12:45, Blood Gas Profile - Arterial	  pH, Arterial:    7.51, [7.35 - 7.45]	  pCO2, Arterial:    29, [35 - 45 mmHg]	  pO2, Arterial:    65, [83 - 108 mmHg]	  HCO3, Arterial: 26, [20 - 26 mmoL/L]	  Base Excess, Arterial: 1.5, [-3.0 - 3.0 mmol/L]	  Oxygen Saturation, Arterial:    94, [95 - 99 %]	  FIO2, Arterial: room Air	  Blood Gas Source Arterial: Arterial	  General Chemistry:	    16-Aug-2017 13:26, Serum Pro-Brain Natriuretic Peptide	  Serum Pro-Brain Natriuretic Peptide: 284, [0 - 300 pg/mL], NT-proBNP Interpretive comments:Acute Congestive Heart Failure is unlikely if NT-proBNP is less than 300pg/mL, for any age.Consider Acute Congestive Heart Failure if:AGE               NT-proBNP Result___               ________________< 50year           > 450 pg/mL50 - 75 years     > 900 pg/mL> 75 years         > 1800 pg/mlAll results require clinical correlation. Consider obtaining a baseline or"dry" NT-proBNP level when the patient is stabilized, so that subsequentlevels can be related to that. Patients with recurrent CHF may haveelevatedNT-proBNP levels. Acute failure episodes generally produce levels atleast 25% greater than base line levels. The above values are derived from a [facility] international study, "CORRIE Mccoy, et al,  HeartJournal,2006; 27:330-337.	    16-Aug-2017 13:26, Troponin T, Serum	  Troponin T, Serum: <0.01, [0.00 - 0.06 ng/mL]	  Coagulation:	    16-Aug-2017 13:26, D-Dimer Assay, Quantitative	  D-Dimer Assay, Quantitative: 172, [ - <=229 ng/mL DDU], New Quantitative D-DIMER units and cut-off effective 7/1/2014    D-Dimer result less than 230 ng/mL DDU correlates with the absence ofthrombosis in a patient with a low and moderate     pre-test probability of thrombosis.    1 DDU is approximately equal to 2 ng/mL FEU (previous units).	    Radiology:   X-Ray, Fluoroscopy:	    16-Aug-2017 14:02, Xray Chest 1 View AP/PA.	  PACS Image: Image(s) Available	    Assessment and Plan:       71 year old male w/ PMH of COPD, CAD s/p stent, CKD-3 , hypothyroidism, HTN & gout presents w/ with increasing shortness of breath for 1 week likely 2/2 COPD exacerbation.   s/p Solumedrol 125 IVP x1 in the ED.   Well's score for PE: 1.5 , will do d-dimer to rule out PE.   71 year old male w/ PMH of COPD, CAD s/p stent, CKD, hypothyroidism, HTN, HLD & gout presents w/ with increasing shortness of breath for 1 week likely 2/2 COPD exacerbation.     s/p Solumedrol 125 IVP x1 in the ED    Problem/Plan - 1:  ·  Problem: Chronic obstructive pulmonary disease with acute exacerbation.    Plan: - patient has shortness of breath and has recently travelled from [place], however his Well's score for PE is 1.5, low risk, therefore will do d-dimer to rule out PE    Diet: DASH/TLC  Activity: ambulate as tolerated  Oxygen 2L NC titrate as needed to keep Saturation between 90-94%  Duonebs Q6H & Albuterol Q6H PRN  s/p Solumedrol 125mg IVP X 1 in ED  Solumedrol 40 mg IVP Q12H  Zithromax 500mg IVP daily  Protonix 40mg PO daily  CBC, CMP, Mg, Phos, Sputum culture, procalcitonin, prealbumin, blood culturesAdmit to medicine-resident service under Dr. Thorne  Bed: any w/   Diet: DASH/TLC  Activity: ambulate as tolerated  Oxygen 2L NC titrate as needed to keep Saturation between 90-94%  Duonebs Q6H & Albuterol Q6H PRN  s/p Solumedrol 125mg IVP X 1 in ED  Solumedrol 40 mg IVP Q12H  Zithromax 500mg IVP daily  Pulmonary (Dr. Helton) on board   Protonix 40mg PO daily  CBC, CMP, Mg, Phos, Sputum culture, procalcitonin, prealbumin, blood cultures.     Problem/Plan - 2:  ·  Problem: CAD (coronary artery disease).    Plan: s/p stent (2016)  Plavix 75 mg PO daily  Furosemide 20 mg PRN  Aspirin 81 mg PO daily.     Problem/Plan - 3:  ·  Problem: CKD - 3 (chronic kidney disease).    Plan: s/p 1L NS in ED  Monitor renal function       Problem/Plan - 4:  ·  Problem: Hypothyroid.      Plan: Synthroid 75 mcg PO every other day      Problem/Plan - 5:  ·  Problem: Hypertension.      Plan: Patient is normotensive and has CKD-3,

## 2017-08-17 NOTE — H&P ADULT - FAMILY HISTORY
<<-----Click on this checkbox to enter Family History Family history of MI (myocardial infarction), Brother     Father  Still living? Unknown  Family history of prostate cancer in father, Age at diagnosis: Age Unknown

## 2017-08-17 NOTE — DISCHARGE NOTE ADULT - INSTRUCTIONS
low Na diet;  Dash TLC;  fruits and vegetables are very important, avoid processed food, foods with excessive sugar;

## 2017-08-17 NOTE — DISCHARGE NOTE ADULT - CARE PLAN
Principal Discharge DX:	Chronic obstructive pulmonary disease with acute exacerbation  Goal:	resolving  Instructions for follow-up, activity and diet:	continue treatment with your nebulizers  Secondary Diagnosis:	Stage 3 chronic kidney disease  Instructions for follow-up, activity and diet:	f/u with a nephrologist;  Secondary Diagnosis:	Essential hypertension  Goal:	maintenance  Instructions for follow-up, activity and diet:	continue bp meds;  Secondary Diagnosis:	Coronary artery disease, angina presence unspecified, unspecified vessel or lesion type, unspecified whether native or transplanted heart  Goal:	maintenance;  Instructions for follow-up, activity and diet:	continue aspirin and statins;  Secondary Diagnosis:	Hyperlipidemia, unspecified hyperlipidemia type  Goal:	maintenance;  Instructions for follow-up, activity and diet:	continue statins Principal Discharge DX:	Chronic obstructive pulmonary disease with acute exacerbation  Goal:	resolving  Instructions for follow-up, activity and diet:	continue treatment with your nebulizers  continue prednisone taper  Follow up with Pulmonologist  COnsider work up for sleep apnea; might need a sleep study  Secondary Diagnosis:	Stage 3 chronic kidney disease  Instructions for follow-up, activity and diet:	f/u with a nephrologist; maintain euvolemia  Secondary Diagnosis:	Essential hypertension  Goal:	maintenance  Instructions for follow-up, activity and diet:	continue bp meds;  Secondary Diagnosis:	Coronary artery disease, angina presence unspecified, unspecified vessel or lesion type, unspecified whether native or transplanted heart  Goal:	maintenance;  Instructions for follow-up, activity and diet:	continue aspirin and statins;  Secondary Diagnosis:	Hyperlipidemia, unspecified hyperlipidemia type  Goal:	maintenance;  Instructions for follow-up, activity and diet:	continue statins

## 2017-08-17 NOTE — DISCHARGE NOTE ADULT - PROVIDER TOKENS
TOKEN:'[id number]',FREE:[LAST:[daisha],FIRST:[don],PHONE:[phone]],FAX:[(   )    -],ADDRESS:[[address] [address] [phone]]]

## 2017-08-17 NOTE — PROGRESS NOTE ADULT - ASSESSMENT
71 year old male w/ PMH of COPD, CAD s/p stent, CKD, hypothyroidism, HTN, HLD & gout presents w/ with increasing shortness of breath for 1 week likely 2/2 COPD exacerbation.     s/p Solumedrol 125 IVP x1 in the ED
Assess  AECOPD  Platelike atelectasis  Hypoxia  Rec  Steroid taper  DuoNeb  Azithromycin  Pursed-lip breathing re-emphasized to patient

## 2017-08-17 NOTE — H&P ADULT - ASSESSMENT
71 year old male w/ PMH of COPD, CAD s/p stent, CKD, hypothyroidism, HTN, HLD & gout presents w/ with increasing shortness of breath for 1 week likely 2/2 COPD exacerbation.     s/p Solumedrol 125 IVP x1 in the ED 71 year old male w/ PMH of COPD, CAD s/p stent, CKD, hypothyroidism, HTN, HLD & gout presents w/ with increasing shortness of breath for 1 week likely 2/2 COPD exacerbation.   s/p Solumedrol 125 IVP x1 in the ED.   Well's score for PE: 1.5 , will do d-dimer to rule out PE.

## 2017-08-17 NOTE — PROGRESS NOTE ADULT - PROBLEM SELECTOR PLAN 3
s/p 1L NS in ED  Monitor renal function s/p 1L NS in ED  Monitor renal function  Nephrology consult (Dr. Tellez) called.

## 2017-08-17 NOTE — H&P ADULT - NSHPPHYSICALEXAM_GEN_ALL_CORE
T(F): 98.1  HR: 98  BP: 105/74  RR: 16  SpO2: 91% on RA    Physical Exam:   GENERAL: NAD, well-groomed, well-developed  HEAD:  Atraumatic, Normocephalic  EYES: EOMI, PERRLA, conjunctiva and sclera clear  ENMT: No tonsillar erythema, exudates, or enlargement; Moist mucous membranes, Good dentition, No lesions  NECK: Supple, No JVD, Normal thyroid  NERVOUS SYSTEM:  Alert & Oriented X3, Good concentration; Motor Strength 5/5 B/L upper and lower extremities; DTRs 2+ intact and symmetric  CHEST/LUNG: mild left expiratory wheezes; No rales, rhonchi, or rubs  HEART: Regular rate and rhythm; No murmurs, rubs, or gallops  ABDOMEN: Soft, Nontender, Nondistended; Bowel sounds present  EXTREMITIES:  2+ Peripheral Pulses, No clubbing, cyanosis, or edema  LYMPH: No lymphadenopathy noted  SKIN: No rashes or lesions

## 2017-08-17 NOTE — H&P ADULT - PROBLEM SELECTOR PLAN 3
Monitor renal function  May consider renal consult in AM s/p 1L NS in ED  Monitor renal function s/p 1L NS in ED  Monitor renal function  Renal consult (Dr. Tellez) called.

## 2017-08-17 NOTE — DISCHARGE NOTE ADULT - HOSPITAL COURSE
71 year old male w/ PMH of COPD, CAD s/p stent, CKD, hypothyroidism, HTN, HLD & gout presents w/ with increasing shortness of breath for 1 week likely 2/2 COPD exacerbation likely 2/2 pneumonia. Patient treated with solumedrol, duonebs, albuterol, Oxygen 2liters.  Patient started on Zithromax, subsequent sputum and blood cultures are neg.      Patient was found to have No evidence of SAI, PE, PNA or ADHF.  Patient will be discharged home on a 5 day taper of steroids, zithromax for 4 more days, f/u with pcp.  f/u with pulmonologist  in the next week;  f/u with pcp in the next week;    Patient tis medically optimized for discharge. 71 year old male w/ PMH of COPD, CAD s/p stent, CKD, hypothyroidism, HTN, HLD & gout presents w/ with increasing shortness of breath for 1 week likely 2/2 COPD exacerbation likely 2/2 pneumonia. Patient treated with solumedrol, duonebs, albuterol, Oxygen 2liters.  Patient started on Zithromax, subsequent sputum and blood cultures are neg.      Patient was found to have No evidence of SAI, PE, PNA or ADHF.  Patient will be discharged home on a 5 day taper of steroids, zithromax for 4 more days, f/u with pcp.  f/u with pulmonologist  in the next week;  f/u with pcp in the next week;    Patient tis medically optimized for discharge.    Pt seen and examined with FM residents.  A&P reviewed.  DC on abx and steroids taper after SpO2 on RA with ambulation is accessed  Time spent 65 min 71 year old male w/ PMH of COPD, CAD s/p stent, CKD, hypothyroidism, HTN, HLD & gout presents w/ with increasing shortness of breath for 1 week likely 2/2 COPD exacerbation likely 2/2 pneumonia. Patient treated with solumedrol, duonebs, albuterol, Oxygen 2liters.  Patient started on Zithromax, subsequent sputum and blood cultures are neg.      Patient was found to have No evidence of SAI, PE, PNA or ADHF.  Patient will be discharged home on a 5 day taper of steroids, zithromax for 4 more days, f/u with pcp.  f/u with pulmonologist  in the next week;  f/u with pcp in the next week;    Patient tis medically optimized for discharge.  Patient did not qualify for home O2. 71 year old male w/ PMH of COPD, CAD s/p stent, CKD, hypothyroidism, HTN, HLD & gout presents w/ with increasing shortness of breath for 1 week likely 2/2 COPD exacerbation likely 2/2 pneumonia. Patient treated with solumedrol, duonebs, albuterol, Oxygen 2liters.  Patient started on Zithromax, subsequent sputum and blood cultures are neg.      Patient was found to have No evidence of PE, PNA or acute dCHF.  Patient will be discharged home on a 5 day taper of steroids, zithromax for 4 more days, f/u with pcp.  f/u with pulmonologist  in the next week;  f/u with pcp in the next week;    Patient tis medically optimized for discharge.  Patient did not qualify for home O2.   Pt found to have SAI, but stable CKD - to follow up with Neprhology outpatient.  Avoid nephrotoxic drugs.

## 2017-08-17 NOTE — H&P ADULT - PROBLEM SELECTOR PLAN 5
Patient is normotensive and has CKD  will hold home BP med (losartan 25 mg daily) for now  monitor BP

## 2017-08-17 NOTE — DISCHARGE NOTE ADULT - SECONDARY DIAGNOSIS.
Stage 3 chronic kidney disease Essential hypertension Coronary artery disease, angina presence unspecified, unspecified vessel or lesion type, unspecified whether native or transplanted heart Hyperlipidemia, unspecified hyperlipidemia type

## 2017-08-17 NOTE — DISCHARGE NOTE ADULT - ADDITIONAL INSTRUCTIONS
Patient will be discharged home on a 5 day taper of steroids, zithromax for 4 more days, f/u with pcp.  f/u with pulmonologist  in the next week;  f/u with pcp in the next week;  Williams Elaine (PMD)  Dr. Te Baer (Pulm) Patient will be discharged home on a 5 day taper of steroids, zithromax for 4 more days, f/u with pcp.  f/u with pulmonologist  in the next week;  f/u with pcp in the next week;  Williams Elaine (PMD);   Patient does not qualify for Home O2;     Dr. Te Baer (Pulm)

## 2017-08-17 NOTE — DISCHARGE NOTE ADULT - MEDICATION SUMMARY - MEDICATIONS TO TAKE
I will START or STAY ON the medications listed below when I get home from the hospital:    predniSONE 20 mg oral tablet  -- 2 tab(s) by mouth once a day MDD:Taper: 2 tab by mouth day 1, 1 tab oral Day 2, 1 tab oral day 3, 1 tab by mouth on day 4 (only 5 pills needed)  -- It is very important that you take or use this exactly as directed.  Do not skip doses or discontinue unless directed by your doctor.  Obtain medical advice before taking any non-prescription drugs as some may affect the action of this medication.  Take with food or milk.    -- Indication: For COPD (chronic obstructive pulmonary disease)    aspirin 81 mg oral tablet  -- 1 tab(s) by mouth once a day  -- Indication: For CAD (coronary artery disease)    atorvastatin 10 mg oral tablet  -- 1 tab(s) by mouth once a day (at bedtime)  -- Indication: For CAD (coronary artery disease)    clopidogrel 75 mg oral tablet  -- 1 tab(s) by mouth once a day  -- Indication: For CAD (coronary artery disease)    Ventolin HFA 90 mcg/inh inhalation aerosol  -- 2 puff(s) inhaled 4 times a day  -- Indication: For COPD (chronic obstructive pulmonary disease)    albuterol-ipratropium 2.5 mg-0.5 mg/3 mL inhalation solution  -- 3 milliliter(s) inhaled every 6 hours  -- Indication: For COPD (chronic obstructive pulmonary disease)    Breo Ellipta 100 mcg-25 mcg/inh inhalation powder  -- 1 puff(s) inhaled once a day  -- Indication: For COPD (chronic obstructive pulmonary disease)    Spiriva Respimat 1.25 mcg/inh inhalation aerosol  -- 2 puff(s) inhaled once a day  -- Indication: For COPD (chronic obstructive pulmonary disease)    Lasix 20 mg oral tablet  -- 1 tab(s) by mouth once a day, As Needed  -- Indication: For fluid retention    Azithromycin 5 Day Dose Pack 250 mg oral tablet  -- 1 tab(s) by mouth once a day  -- Do not take dairy products, antacids, or iron preparations within one hour of this medication.  Finish all this medication unless otherwise directed by prescriber.    -- Indication: For pna    saccharomyces boulardii lyo 250 mg oral capsule  -- 1 cap(s) by mouth 2 times a day  -- Indication: For prevent gi bacterial overgrowth    pantoprazole 40 mg oral delayed release tablet  -- 1 tab(s) by mouth once a day  -- Indication: For GERD (gastroesophageal reflux disease)    levothyroxine 88 mcg (0.088 mg) oral tablet  -- 1 tab(s) by mouth every other day  -- Indication: For Hypothyroidism    levothyroxine 75 mcg (0.075 mg) oral tablet  -- 1 tab(s) by mouth every other day  -- Indication: For Hypothyroidism I will START or STAY ON the medications listed below when I get home from the hospital:    predniSONE 20 mg oral tablet  -- 2 tab(s) by mouth once a day MDD:Taper: 2 tab by mouth day 1, 1 tab oral Day 2, 1 tab oral day 3, 1 tab by mouth on day 4 (only 5 pills needed)  -- It is very important that you take or use this exactly as directed.  Do not skip doses or discontinue unless directed by your doctor.  Obtain medical advice before taking any non-prescription drugs as some may affect the action of this medication.  Take with food or milk.    -- Indication: For COPD (chronic obstructive pulmonary disease)    aspirin 81 mg oral tablet  -- 1 tab(s) by mouth once a day  -- Indication: For CAD (coronary artery disease)    Zyloprim 100 mg oral tablet  -- 1 tab(s) by mouth once a day  -- Indication: For Gout    atorvastatin 10 mg oral tablet  -- 1 tab(s) by mouth once a day (at bedtime)  -- Indication: For CAD (coronary artery disease)    clopidogrel 75 mg oral tablet  -- 1 tab(s) by mouth once a day  -- Indication: For CAD (coronary artery disease)    Breo Ellipta 100 mcg-25 mcg/inh inhalation powder  -- 1 puff(s) inhaled once a day  -- Indication: For COPD (chronic obstructive pulmonary disease)    Spiriva Respimat 1.25 mcg/inh inhalation aerosol  -- 2 puff(s) inhaled once a day  -- Indication: For COPD (chronic obstructive pulmonary disease)    Ventolin HFA 90 mcg/inh inhalation aerosol  -- 2 puff(s) inhaled 4 times a day, As Needed -for bronchospasm  -- Indication: For COPD (chronic obstructive pulmonary disease)    Lasix 20 mg oral tablet  -- 1 tab(s) by mouth once a day, As Needed  -- Indication: For fluid retention    Azithromycin 5 Day Dose Pack 250 mg oral tablet  -- 1 tab(s) by mouth once a day  -- Do not take dairy products, antacids, or iron preparations within one hour of this medication.  Finish all this medication unless otherwise directed by prescriber.    -- Indication: For pna    saccharomyces boulardii lyo 250 mg oral capsule  -- 1 cap(s) by mouth 2 times a day  -- Indication: For prevent gi bacterial overgrowth    pantoprazole 40 mg oral delayed release tablet  -- 1 tab(s) by mouth once a day  -- Indication: For GERD (gastroesophageal reflux disease)    levothyroxine 88 mcg (0.088 mg) oral tablet  -- 1 tab(s) by mouth every other day  -- Indication: For Hypothyroidism    levothyroxine 75 mcg (0.075 mg) oral tablet  -- 1 tab(s) by mouth every other day  -- Indication: For Hypothyroidism

## 2017-08-17 NOTE — CONSULT NOTE ADULT - ASSESSMENT
1.CKD - 3 Stable    RACHID gagnon MD in [facility].,    2.COPD Exacerbation - Improved,

## 2017-08-17 NOTE — DISCHARGE NOTE ADULT - CARE PROVIDER_API CALL
Brannon Helton), Critical Care Medicine; Internal Medicine; Pulmonary Disease  [address] [facility] [address]  [place] [address]  Phone: [phone]  Fax: [phone]    [address] [facility] [address]  [place] [address] [phone]  Phone: [phone]  Fax: (       -

## 2017-08-17 NOTE — H&P ADULT - NSHPSOCIALHISTORY_GEN_ALL_CORE
Retired, lives with wife in [place]  former smoker (40 pack years, quit 5-6 years ago)  occasional alcohol consumption  denies drug use

## 2017-08-17 NOTE — PROGRESS NOTE ADULT - ATTENDING COMMENTS
Pt seen and examined with Fm residents.  A&P reviewed.  No evidence of SAI, PE, PNA or ADHF.  Likely minimal COPD exacerbation.  DC home on short course of steroids taper, po zithromax for 4 more days and f/u with PMD  Check SpO2 on RA prior to DC - may need portable O2

## 2017-08-17 NOTE — H&P ADULT - PROBLEM SELECTOR PLAN 4
Synthroid 75 mcg PO every other day  Synthyroid 88 mcg PO eery other day.   TSH Synthroid 75 mcg PO every other day  Synthyroid 88 mcg PO eery other day.   serum TSH

## 2017-08-17 NOTE — H&P ADULT - NSHPLABSRESULTS_GEN_ALL_CORE
15.7   12.6  )-----------( 226      ( 16 Aug 2017 13:26 )             46.4     08-16    140  |  99  |  31.0<H>  ----------------------------<  113  4.7   |  23.0  |  1.76<H>    Ca    10.6<H>      16 Aug 2017 13:26    TPro  6.6  /  Alb  3.5  /  TBili  0.9  /  DBili  x   /  AST  32  /  ALT  56<H>  /  AlkPhos  63  08-16

## 2017-08-17 NOTE — DISCHARGE NOTE ADULT - CARE PROVIDERS DIRECT ADDRESSES
[email or web address],DirectAddress_Unknown

## 2017-08-17 NOTE — H&P ADULT - PROBLEM SELECTOR PLAN 1
Admit to medicine-resident service under Dr. Thorne  Bed: any w/   Diet: DASH/TLC  Activity: ambulate as tolerated  Oxygen 2L NC titrate as needed to keep Saturation between 90-94%  Duonebs Q6H & Albuterol Q6H PRN  s/p Solumedrol 125mg IVP X 1 in ED  Solumedrol 40 mg IVP Q12H  Zithromax 500mg IVP x1 then 250 mg daily  Protonix 40mg PO daily  CBC, CMP, Mg, Phos, Sputum culture, Admit to medicine-resident service under Dr. Thorne  Bed: any w/   Diet: DASH/TLC  Activity: ambulate as tolerated  Oxygen 2L NC titrate as needed to keep Saturation between 90-94%  Duonebs Q6H & Albuterol Q6H PRN  s/p Solumedrol 125mg IVP X 1 in ED  Solumedrol 40 mg IVP Q12H  Zithromax 500mg IVP x1 then 250 mg daily  Protonix 40mg PO daily  CBC, CMP, Mg, Phos, Sputum culture, procalcitonin, prealbumin, blood cultures Admit to medicine-resident service under Dr. Thorne  Bed: any w/   Diet: DASH/TLC  Activity: ambulate as tolerated  Oxygen 2L NC titrate as needed to keep Saturation between 90-94%  Duonebs Q6H & Albuterol Q6H PRN  s/p Solumedrol 125mg IVP X 1 in ED  Solumedrol 40 mg IVP Q12H  Zithromax 500mg IVP x1 then 250 mg daily  Pulmonary (Dr. Helton) on board   Protonix 40mg PO daily  CBC, CMP, Mg, Phos, Sputum culture, procalcitonin, prealbumin, blood cultures Admit to medicine-resident service under Dr. Thorne  Bed: any w/   Diet: DASH/TLC  Activity: ambulate as tolerated  Oxygen 2L NC titrate as needed to keep Saturation between 90-94%  Duonebs Q6H & Albuterol Q6H PRN  s/p Solumedrol 125mg IVP X 1 in ED  Solumedrol 40 mg IVP Q12H  Zithromax 500mg IVP daily  Pulmonary (Dr. Helton) on board   Protonix 40mg PO daily  CBC, CMP, Mg, Phos, Sputum culture, procalcitonin, prealbumin, blood cultures - patient has shortness of breath and has recently travelled from [place], however his Well's score for PE is 1.5, low risk, therefore will do d-dimer to rule out PE  Admit to medicine-resident service under Dr. Thorne  Bed: any w/   Diet: DASH/TLC  Activity: ambulate as tolerated  Oxygen 2L NC titrate as needed to keep Saturation between 90-94%  Duonebs Q6H & Albuterol Q6H PRN  s/p Solumedrol 125mg IVP X 1 in ED  Solumedrol 40 mg IVP Q12H  Zithromax 500mg IVP daily  Pulmonary (Dr. Helton) on board   Protonix 40mg PO daily  CBC, CMP, Mg, Phos, Sputum culture, procalcitonin, prealbumin, blood cultures

## 2017-08-17 NOTE — H&P ADULT - PMH
CAD (coronary artery disease)    CKD (chronic kidney disease)    COPD (chronic obstructive pulmonary disease)    Gout    Hyperlipidemia    Hypertension    Hypothyroid

## 2017-08-17 NOTE — H&P ADULT - HISTORY OF PRESENT ILLNESS
71 year old male w/ PMH of COPD, CAD s/p stent, CKD, hypothyroidism, HTN, HLD & gout presents w/ with increasing shortness of breath for 1 week. Patient recently drove from [place] to [place] and was planning to drive back [place] today but he was noted to have subjective fever, productive cough w/ yellow sputum. Patient states that he's been complaint his medications and diet. Patient uses 2 pillows to sleep and can walk 1 block before getting out of breath. Patient admits to orthopnea but denies of any chest pain, palpitations, sick contacts or lower extremities swelling, calf tenderness, bowel or urinary changes.

## 2017-08-21 LAB
CULTURE RESULTS: SIGNIFICANT CHANGE UP
CULTURE RESULTS: SIGNIFICANT CHANGE UP
SPECIMEN SOURCE: SIGNIFICANT CHANGE UP
SPECIMEN SOURCE: SIGNIFICANT CHANGE UP

## 2017-09-25 NOTE — ED PROVIDER NOTE - ENMT NEGATIVE STATEMENT, MLM
----- Message from Ivy Perez sent at 9/25/2017  9:07 AM CDT -----  Contact: self  Pt calling to request a 90 day refill of the following to be sent to [facility]. Pt states he received a letter telling him a 90 day refill has a $0 copay. Pt can be reached at [phone].    fosinopril (MONOPRIL) 40 MG tablet  allopurinol (ZYLOPRIM) 300 MG tablet   Ears: no ear pain and no hearing problems.Nose: no nasal congestion and no nasal drainage.Mouth/Throat: no dysphagia, no hoarseness and no throat pain.Neck: no lumps, no pain, no stiffness and no swollen glands.

## 2018-09-13 ENCOUNTER — APPOINTMENT (RX ONLY)
Dept: URBAN - METROPOLITAN AREA CLINIC 54 | Facility: CLINIC | Age: 73
Setting detail: DERMATOLOGY
End: 2018-09-13

## 2018-09-13 DIAGNOSIS — L57.0 ACTINIC KERATOSIS: ICD-10-CM

## 2018-09-13 DIAGNOSIS — B35.1 TINEA UNGUIUM: ICD-10-CM

## 2018-09-13 DIAGNOSIS — L82.0 INFLAMED SEBORRHEIC KERATOSIS: ICD-10-CM

## 2018-09-13 DIAGNOSIS — L82.1 OTHER SEBORRHEIC KERATOSIS: ICD-10-CM

## 2018-09-13 DIAGNOSIS — L85.3 XEROSIS CUTIS: ICD-10-CM

## 2018-09-13 DIAGNOSIS — L81.4 OTHER MELANIN HYPERPIGMENTATION: ICD-10-CM

## 2018-09-13 DIAGNOSIS — L57.8 OTHER SKIN CHANGES DUE TO CHRONIC EXPOSURE TO NONIONIZING RADIATION: ICD-10-CM

## 2018-09-13 PROCEDURE — 17110 DESTRUCTION B9 LES UP TO 14: CPT

## 2018-09-13 PROCEDURE — 99202 OFFICE O/P NEW SF 15 MIN: CPT | Mod: 25

## 2018-09-13 PROCEDURE — ? COUNSELING

## 2018-09-13 PROCEDURE — 17003 DESTRUCT PREMALG LES 2-14: CPT

## 2018-09-13 PROCEDURE — 17000 DESTRUCT PREMALG LESION: CPT | Mod: 59

## 2018-09-13 PROCEDURE — ? LIQUID NITROGEN

## 2018-09-13 ASSESSMENT — LOCATION DETAILED DESCRIPTION DERM
LOCATION DETAILED: RIGHT ANTERIOR MEDIAL MALLEOLUS
LOCATION DETAILED: LEFT PROXIMAL PRETIBIAL REGION
LOCATION DETAILED: 1ST WEB SPACE LEFT HAND
LOCATION DETAILED: LEFT SUPERIOR UPPER BACK
LOCATION DETAILED: SUPERIOR THORACIC SPINE
LOCATION DETAILED: RIGHT DISTAL RADIAL DORSAL FOREARM
LOCATION DETAILED: RIGHT PROXIMAL DORSAL FOREARM
LOCATION DETAILED: LEFT DISTAL PLANTAR GREAT TOE
LOCATION DETAILED: RIGHT MID-UPPER BACK
LOCATION DETAILED: LEFT ULNAR DORSAL HAND
LOCATION DETAILED: RIGHT ANKLE
LOCATION DETAILED: LEFT RADIAL DORSAL HAND

## 2018-09-13 ASSESSMENT — LOCATION SIMPLE DESCRIPTION DERM
LOCATION SIMPLE: LEFT GREAT TOE
LOCATION SIMPLE: RIGHT UPPER BACK
LOCATION SIMPLE: LEFT HAND
LOCATION SIMPLE: RIGHT FOREARM
LOCATION SIMPLE: LEFT PRETIBIAL REGION
LOCATION SIMPLE: UPPER BACK
LOCATION SIMPLE: LEFT UPPER BACK
LOCATION SIMPLE: RIGHT ANKLE

## 2018-09-13 ASSESSMENT — LOCATION ZONE DERM
LOCATION ZONE: TOE
LOCATION ZONE: ARM
LOCATION ZONE: LEG
LOCATION ZONE: HAND
LOCATION ZONE: TRUNK

## 2018-12-13 ENCOUNTER — APPOINTMENT (RX ONLY)
Dept: URBAN - METROPOLITAN AREA CLINIC 53 | Facility: CLINIC | Age: 73
Setting detail: DERMATOLOGY
End: 2018-12-13

## 2018-12-13 DIAGNOSIS — L40.0 PSORIASIS VULGARIS: ICD-10-CM

## 2018-12-13 PROBLEM — H91.90 UNSPECIFIED HEARING LOSS, UNSPECIFIED EAR: Status: ACTIVE | Noted: 2018-12-13

## 2018-12-13 PROBLEM — L70.0 ACNE VULGARIS: Status: ACTIVE | Noted: 2018-12-13

## 2018-12-13 PROBLEM — E05.90 THYROTOXICOSIS, UNSPECIFIED WITHOUT THYROTOXIC CRISIS OR STORM: Status: ACTIVE | Noted: 2018-12-13

## 2018-12-13 PROBLEM — J44.9 CHRONIC OBSTRUCTIVE PULMONARY DISEASE, UNSPECIFIED: Status: ACTIVE | Noted: 2018-12-13

## 2018-12-13 PROBLEM — L55.1 SUNBURN OF SECOND DEGREE: Status: ACTIVE | Noted: 2018-12-13

## 2018-12-13 PROBLEM — E03.9 HYPOTHYROIDISM, UNSPECIFIED: Status: ACTIVE | Noted: 2018-12-13

## 2018-12-13 PROBLEM — E78.5 HYPERLIPIDEMIA, UNSPECIFIED: Status: ACTIVE | Noted: 2018-12-13

## 2018-12-13 PROBLEM — L30.9 DERMATITIS, UNSPECIFIED: Status: ACTIVE | Noted: 2018-12-13

## 2018-12-13 PROBLEM — D48.5 NEOPLASM OF UNCERTAIN BEHAVIOR OF SKIN: Status: ACTIVE | Noted: 2018-12-13

## 2018-12-13 PROBLEM — M12.9 ARTHROPATHY, UNSPECIFIED: Status: ACTIVE | Noted: 2018-12-13

## 2018-12-13 PROBLEM — I10 ESSENTIAL (PRIMARY) HYPERTENSION: Status: ACTIVE | Noted: 2018-12-13

## 2018-12-13 PROBLEM — L23.7 ALLERGIC CONTACT DERMATITIS DUE TO PLANTS, EXCEPT FOOD: Status: ACTIVE | Noted: 2018-12-13

## 2018-12-13 PROCEDURE — ? PRESCRIPTION

## 2018-12-13 PROCEDURE — 11100: CPT

## 2018-12-13 PROCEDURE — 99213 OFFICE O/P EST LOW 20 MIN: CPT | Mod: 25

## 2018-12-13 PROCEDURE — ? BIOPSY BY PUNCH METHOD

## 2018-12-13 RX ORDER — CLOBETASOL PROPIONATE 0.5 MG/G
OINTMENT TOPICAL
Qty: 1 | Refills: 6 | Status: ERX | COMMUNITY
Start: 2018-12-13

## 2018-12-13 RX ADMIN — CLOBETASOL PROPIONATE: 0.5 OINTMENT TOPICAL at 00:00

## 2018-12-13 ASSESSMENT — LOCATION ZONE DERM: LOCATION ZONE: ARM

## 2018-12-13 ASSESSMENT — LOCATION DETAILED DESCRIPTION DERM: LOCATION DETAILED: LEFT DISTAL DORSAL FOREARM

## 2018-12-13 ASSESSMENT — LOCATION SIMPLE DESCRIPTION DERM: LOCATION SIMPLE: LEFT FOREARM

## 2018-12-13 NOTE — PROCEDURE: BIOPSY BY PUNCH METHOD
X Size Of Lesion In Cm (Optional): 0
Detail Level: Simple
Suture Removal: 14 days
Anesthesia Type: 1% lidocaine with epinephrine
Billing Type: Third-Party Bill
Bill For Surgical Tray: no
Anesthesia Volume In Cc (Will Not Render If 0): 0.5
Punch Size In Mm: 4
Post-Care Instructions: I reviewed with the patient in detail post-care instructions. Patient is to keep the biopsy site dry overnight, and then apply bacitracin twice daily until healed. Patient may apply hydrogen peroxide soaks to remove any crusting.
Wound Care: Petrolatum
Was A Bandage Applied: Yes
Consent: Written consent was obtained and risks were reviewed including but not limited to scarring, infection, bleeding, scabbing, incomplete removal, nerve damage and allergy to anesthesia.
Biopsy Type: H and E
Hemostasis: None
Lab: 6
Notification Instructions: Patient will be notified of biopsy results. However, patient instructed to call the office if not contacted within 2 weeks.
Epidermal Sutures: 4-0 Ethilon
Home Suture Removal Text: Patient was provided a home suture removal kit and will remove their sutures at home.  If they have any questions or difficulties they will call the office.
Dressing: bandage
Lab Facility: [id number]

## 2019-01-03 ENCOUNTER — APPOINTMENT (RX ONLY)
Dept: URBAN - METROPOLITAN AREA CLINIC 53 | Facility: CLINIC | Age: 74
Setting detail: DERMATOLOGY
End: 2019-01-03

## 2019-01-03 DIAGNOSIS — L259 CONTACT DERMATITIS AND OTHER ECZEMA, UNSPECIFIED CAUSE: ICD-10-CM

## 2019-01-03 PROBLEM — L30.8 OTHER SPECIFIED DERMATITIS: Status: ACTIVE | Noted: 2019-01-03

## 2019-01-03 PROCEDURE — ? COUNSELING

## 2019-01-03 PROCEDURE — 99213 OFFICE O/P EST LOW 20 MIN: CPT

## 2019-01-03 PROCEDURE — ? TREATMENT REGIMEN

## 2019-01-03 ASSESSMENT — LOCATION DETAILED DESCRIPTION DERM: LOCATION DETAILED: LEFT RADIAL DORSAL HAND

## 2019-01-03 ASSESSMENT — LOCATION SIMPLE DESCRIPTION DERM: LOCATION SIMPLE: LEFT HAND

## 2019-01-03 ASSESSMENT — LOCATION ZONE DERM: LOCATION ZONE: HAND

## 2019-01-03 NOTE — PROCEDURE: TREATMENT REGIMEN
Detail Level: Zone
Continue Regimen: Clobetasol ointment BID X 2 weeks at a time
Plan: Moisturize between uses of steroid

## 2020-09-22 ENCOUNTER — APPOINTMENT (RX ONLY)
Dept: URBAN - METROPOLITAN AREA CLINIC 53 | Facility: CLINIC | Age: 75
Setting detail: DERMATOLOGY
End: 2020-09-22

## 2020-09-22 VITALS — TEMPERATURE: 97.4 F

## 2020-09-22 DIAGNOSIS — L29.8 OTHER PRURITUS: ICD-10-CM

## 2020-09-22 DIAGNOSIS — L20.89 OTHER ATOPIC DERMATITIS: ICD-10-CM

## 2020-09-22 DIAGNOSIS — L29.89 OTHER PRURITUS: ICD-10-CM

## 2020-09-22 PROBLEM — L30.9 DERMATITIS, UNSPECIFIED: Status: ACTIVE | Noted: 2020-09-22

## 2020-09-22 PROCEDURE — 99213 OFFICE O/P EST LOW 20 MIN: CPT

## 2020-09-22 PROCEDURE — ? RECOMMENDATIONS

## 2020-09-22 PROCEDURE — ? COUNSELING

## 2020-09-22 PROCEDURE — ? ADDITIONAL NOTES

## 2020-09-22 PROCEDURE — ? PRESCRIPTION

## 2020-09-22 PROCEDURE — ? OBSERVATION AND MEASURE

## 2020-09-22 RX ORDER — TRIAMCINOLONE ACETONIDE 1 MG/G
1 CREAM TOPICAL BID
Qty: 1 | Refills: 2 | Status: ERX | COMMUNITY
Start: 2020-09-22

## 2020-09-22 RX ADMIN — TRIAMCINOLONE ACETONIDE 1: 1 CREAM TOPICAL at 00:00

## 2020-09-22 ASSESSMENT — LOCATION ZONE DERM: LOCATION ZONE: LEG

## 2020-09-22 ASSESSMENT — LOCATION SIMPLE DESCRIPTION DERM: LOCATION SIMPLE: LEFT PRETIBIAL REGION

## 2020-09-22 ASSESSMENT — LOCATION DETAILED DESCRIPTION DERM: LOCATION DETAILED: LEFT PROXIMAL PRETIBIAL REGION

## 2020-09-22 NOTE — PROCEDURE: RECOMMENDATIONS
Recommendations (Free Text): Use triamcinolone for 2 weeks then Aveeno or Aquaphor ointment for 1 weeks. Repeat.
Detail Level: Zone

## 2022-02-15 NOTE — ED CLERICAL - DIVISION
[place]... Thanks for the visit today.   Continue the penicillin and doxycyline  Please have your kidney function tested at your primary care visit next month

## 2024-09-27 NOTE — PROGRESS NOTE ADULT - SUBJECTIVE AND OBJECTIVE BOX
Provider E-Visit time total (minutes):        PULMONARY PROGRESS NOTE      LORRI LUTZEMBER-[id number]    Patient is a 71y old  Male who presents with a chief complaint of SOB (17 Aug 2017 00:16)  Advance COPD on Breo and spiriva  No 02  Chronic, stable, moderate, 1/2 flight JASSO relieved with 4 minutes of rest  DC cig in 2012      INTERVAL HPI/OVERNIGHT EVENTS:  Min cough.  Thick clear sputum  Less dyspnea    MEDICATIONS  (STANDING):  ALBUTerol/ipratropium for Nebulization 3 milliLiter(s) Nebulizer every 6 hours  methylPREDNISolone sodium succinate Injectable 40 milliGRAM(s) IV Push every 12 hours  allopurinol 100 milliGRAM(s) Oral daily  atorvastatin 10 milliGRAM(s) Oral at bedtime  pantoprazole    Tablet 40 milliGRAM(s) Oral before breakfast  levothyroxine 88 MICROGram(s) Oral every other day  levothyroxine 75 MICROGram(s) Oral every other day  clopidogrel Tablet 75 milliGRAM(s) Oral daily  aspirin  chewable 81 milliGRAM(s) Oral daily  azithromycin  IVPB 500 milliGRAM(s) IV Intermittent every 24 hours  saccharomyces boulardii 250 milliGRAM(s) Oral two times a day  enoxaparin Injectable 40 milliGRAM(s) SubCutaneous daily  magnesium sulfate  IVPB 1 Gram(s) IV Intermittent once      MEDICATIONS  (PRN):  furosemide    Tablet 20 milliGRAM(s) Oral daily PRN fluid overload  ALBUTerol    0.083% 2.5 milliGRAM(s) Nebulizer every 6 hours PRN Shortness of Breath and/or Wheezing      Allergies    ciprofloxacin (Unknown)  penicillin (Rash)  vancomycin (Unknown)    Intolerances        PAST MEDICAL & SURGICAL HISTORY:  Hypothyroid  CAD (coronary artery disease)  COPD (chronic obstructive pulmonary disease)  Gout  CKD (chronic kidney disease)  Hyperlipidemia  Hypertension  S/P prostatectomy  H/O heart artery stent      SOCIAL HISTORY  Smoking History:       REVIEW OF SYSTEMS:    CONSTITUTIONAL:  No distress    HEENT:  Eyes:  No diplopia or blurred vision. ENT:  No earache, sore throat or runny nose.    CARDIOVASCULAR:  No pressure, squeezing, tightness, heaviness or aching about the chest; no palpitations.    RESPIRATORY:  No  PND or orthopnea. Mild SOBOE    GASTROINTESTINAL:  No nausea, vomiting or diarrhea.    GENITOURINARY:  No dysuria, frequency or urgency.    NEUROLOGIC:  No paresthesias, fasciculations, seizures or weakness.    PSYCHIATRIC:  No disorder of thought or mood.    Vital Signs Last 24 Hrs  T(C): 36.6 (17 Aug 2017 04:00), Max: 36.9 (16 Aug 2017 13:08)  T(F): 97.8 (17 Aug 2017 04:00), Max: 98.4 (16 Aug 2017 13:08)  HR: 90 (17 Aug 2017 04:00) (88 - 98)  BP: 112/68 (17 Aug 2017 04:00) (105/74 - 135/66)  BP(mean): --  RR: 19 (17 Aug 2017 04:00) (16 - 19)  SpO2: 91% (17 Aug 2017 04:00) (91% - 96%)    PHYSICAL EXAMINATION:    GENERAL: The patient is awake and alert in no apparent distress.     HEENT: Head is normocephalic and atraumatic. Extraocular muscles are intact. Mucous membranes are moist.    NECK: Supple.    LUNGS: Clear to auscultation without wheezing, rales or rhonchi; respirations unlabored    HEART: Regular rate and rhythm without murmur.    ABDOMEN: Soft, nontender, and nondistended.      EXTREMITIES: Without any cyanosis, clubbing, rash, lesions or edema.    NEUROLOGIC: Grossly intact.    LABS:                        15.0   10.5  )-----------( 205      ( 17 Aug 2017 05:12 )             43.4     08-17    139  |  101  |  34.0<H>  ----------------------------<  198<H>  4.3   |  22.0  |  1.72<H>    Ca    9.4      17 Aug 2017 05:12  Phos  3.4     08-17  Mg     1.9     08-17    TPro  6.4<L>  /  Alb  3.6  /  TBili  0.5  /  DBili  x   /  AST  19  /  ALT  42<H>  /  AlkPhos  61  08-17        ABG - ( 17 Aug 2017 03:40 )  pH: 7.49  /  pCO2: 30    /  pO2: 61    / HCO3: 25    / Base Excess: 0.4   /  SaO2: 92                CARDIAC MARKERS ( 16 Aug 2017 13:26 )  x     / <0.01 ng/mL / x     / x     / x          D-Dimer Assay, Quantitative: 172 ng/mL DDU (08-16-17 @ 13:26)    Serum Pro-Brain Natriuretic Peptide: 182 pg/mL (08-17-17 @ 01:38)  Serum Pro-Brain Natriuretic Peptide: 284 pg/mL (08-16-17 @ 13:26)      Procalcitonin, Serum: <0.05 ng/mL (08-17-17 @ 01:38)      MICROBIOLOGY:NA    RADIOLOGY & ADDITIONAL STUDIES:       EXAM:  CHEST SINGLE VIEW FRONTAL                          PROCEDURE DATE:  08/16/2017      INTERPRETATION:  CHEST AP PORTABLE:    History: sob.     Date and time of exam: 8/16/2017 1:22 PM.    Technique: A single AP view of the chest was obtained.    Comparison exam: No prior exam.    Findings:  The heart and mediastinum are unremarkable. No hilar abnormality.   Bibasilar linear atelectasis is noted. The lungs are otherwise clear. No   evidence of a pleural effusion or pneumothorax. There are degenerative   changes in the spine..    Impression:  Bibasilar atelectasis..    MANUEL SCANLON M.D., ATTENDING RADIOLOGIST  This document has been electronically signed. Aug 16 2017  2:12PM

## 2024-11-15 NOTE — CONSULT NOTE ADULT - CONSULT REQUESTED BY NAME
Health Maintenance       Hepatitis B Vaccine (4 of 4 - 4-dose series)  Overdue since 3/14/2006    Varicella Vaccine (2 of 2 - 2-dose childhood series)  Overdue since 7/16/2009    Chlamydia and Gonorrhea Screening (if sexually active) (Yearly)  Never done    HPV Vaccine (2 - 3-dose series)  Overdue since 3/4/2024    Influenza Vaccine (1)  Overdue since 9/1/2024    COVID-19 Vaccine (1 - 2024-25 season)  Never done    Depression Screening (Yearly)  Due soon on 2/5/2025           Following review of the above:  Patient is not proceeding with: COVID-19, Hep B, HPV, Influenza, and Varicella    Note: Refer to final orders and clinician documentation.       Huma
